# Patient Record
Sex: FEMALE | NOT HISPANIC OR LATINO | URBAN - METROPOLITAN AREA
[De-identification: names, ages, dates, MRNs, and addresses within clinical notes are randomized per-mention and may not be internally consistent; named-entity substitution may affect disease eponyms.]

---

## 2025-03-17 ENCOUNTER — OFFICE VISIT (OUTPATIENT)
Dept: URGENT CARE | Age: 30
End: 2025-03-17
Payer: COMMERCIAL

## 2025-03-17 ENCOUNTER — PHARMACY VISIT (OUTPATIENT)
Dept: PHARMACY | Facility: CLINIC | Age: 30
End: 2025-03-17
Payer: MEDICARE

## 2025-03-17 DIAGNOSIS — A05.9 FOOD POISONING: Primary | ICD-10-CM

## 2025-03-17 DIAGNOSIS — R19.7 DIARRHEA OF PRESUMED INFECTIOUS ORIGIN: ICD-10-CM

## 2025-03-17 DIAGNOSIS — R11.2 NAUSEA AND VOMITING, UNSPECIFIED VOMITING TYPE: ICD-10-CM

## 2025-03-17 PROCEDURE — RXMED WILLOW AMBULATORY MEDICATION CHARGE

## 2025-03-17 RX ORDER — AZITHROMYCIN 500 MG/1
500 TABLET, FILM COATED ORAL DAILY
Qty: 3 TABLET | Refills: 0 | Status: SHIPPED | OUTPATIENT
Start: 2025-03-17 | End: 2025-03-20

## 2025-03-17 RX ORDER — ONDANSETRON 4 MG/1
4 TABLET, ORALLY DISINTEGRATING ORAL EVERY 8 HOURS PRN
Qty: 15 TABLET | Refills: 0 | Status: SHIPPED | OUTPATIENT
Start: 2025-03-17 | End: 2025-03-22

## 2025-03-17 ASSESSMENT — ENCOUNTER SYMPTOMS
POLYDIPSIA: 0
SORE THROAT: 0
SHORTNESS OF BREATH: 0
NAUSEA: 1
FEVER: 1
COUGH: 0
ABDOMINAL PAIN: 1
DIARRHEA: 1
HEADACHES: 0
WHEEZING: 0
FATIGUE: 0
ARTHRALGIAS: 0
CONSTIPATION: 0
VOMITING: 1
MYALGIAS: 0
CHILLS: 1
BLOOD IN STOOL: 0
POLYPHAGIA: 0

## 2025-03-17 NOTE — PROGRESS NOTES
Subjective   Patient ID: Allen Kumar is a 29 y.o. female. They present today with a chief complaint of No chief complaint on file..    History of Present Illness  29 year old female presents with complaint of nausea, vomiting, diarrhea, and fever 101*F. Recently returned from a trip to [facility] with friends and reports eating a meal containing undercooked chicken. States her sx started approximately 36 hours after eating the chicken. Her travel friends have had similar sx. Took imodium with mild relief of loose, watery stools. 1 episode of emesis today. 12 loose stools per day.       Vomiting  Severity:  Mild  Duration:  3 days  Timing:  Intermittent  Number of daily episodes:  1  Quality:  Stomach contents  Able to tolerate:  Liquids  How soon after eating does vomiting occur:  10 minutes  Progression:  Unchanged  Chronicity:  New  Recent urination:  Normal  Context: not post-tussive and not self-induced    Relieved by:  Nothing  Worsened by:  Nothing  Ineffective treatments:  Liquids  Associated symptoms: abdominal pain (crampy, intermittant, generalized), chills, diarrhea and fever    Associated symptoms: no arthralgias, no cough, no headaches, no myalgias, no sore throat and no URI    Diarrhea:     Quality:  Watery    Severity:  Moderate    Duration:  3 days    Timing:  Intermittent    Progression:  Unchanged      Past Medical History  Allergies as of 03/17/2025    (No Known Allergies)       (Not in a hospital admission)       History reviewed. No pertinent past medical history.    History reviewed. No pertinent surgical history.     reports that she has never smoked. She has never used smokeless tobacco.    Review of Systems  Review of Systems   Constitutional:  Positive for chills and fever. Negative for fatigue.   HENT:  Negative for congestion and sore throat.    Respiratory:  Negative for cough, shortness of breath and wheezing.    Cardiovascular:  Negative for chest pain.   Gastrointestinal:  Positive for  abdominal pain (crampy, intermittant, generalized), diarrhea, nausea and vomiting. Negative for blood in stool and constipation.   Endocrine: Negative for polydipsia, polyphagia and polyuria.   Musculoskeletal:  Negative for arthralgias and myalgias.   Skin:  Negative for rash.   Neurological:  Negative for headaches.   All other systems reviewed and are negative.           Objective    There were no vitals filed for this visit.  No LMP recorded.    Physical Exam  Constitutional:       General: She is not in acute distress.     Appearance: Normal appearance. She is not ill-appearing or toxic-appearing.   HENT:      Head: Normocephalic.   Eyes:      Conjunctiva/sclera: Conjunctivae normal.   Pulmonary:      Effort: Pulmonary effort is normal. No respiratory distress.   Musculoskeletal:      Cervical back: Normal range of motion.   Neurological:      Mental Status: She is alert and oriented to person, place, and time.   Psychiatric:         Mood and Affect: Mood normal.         Behavior: Behavior normal.       Physical exam limited due to virtual environment.     Procedures    Point of Care Test & Imaging Results from this visit  No results found for this visit on 03/17/25.   No results found.    Diagnostic study results (if any) were reviewed by [facility].    Assessment/Plan   Allergies, medications, history, and pertinent labs/EKGs/Imaging reviewed by INESSA Plascencia-CNP.     Medical Decision Making  signs and symptoms consistent with food poisoning. No evidence of acute abdomen pain or evidence of dehydration. Treatment is antibiotic coverage, zofran ODT, supportive measures and hydration. Patient is advised to go to clinic or present to ED if symptoms change, worsen, or do not begin to improve within 24-48 hours.  Otherwise follow with PCP.  Patient verbalized understanding and agreeable with plan of care.   I have communicated my name and active licensure information to the patient. The patient's  identity and physical location were verified at the time of this visit. Either the patient or their legal representative were informed of the risks and benefits of, and alternatives to, treatment through a remote evaluation. The patient consented to proceed with the evaluation remotely. This remote visit was conducted using video and audio.         Orders and Diagnoses  Diagnoses and all orders for this visit:  Food poisoning  -     azithromycin (Zithromax) 500 mg tablet; Take 1 tablet (500 mg) by mouth once daily for 3 days.  -     ondansetron ODT (Zofran-ODT) 4 mg disintegrating tablet; Dissolve 1 tablet (4 mg) in the mouth every 8 hours if needed for nausea or vomiting for up to 5 days.  Nausea and vomiting, unspecified vomiting type  Diarrhea of presumed infectious origin      Medical Admin Record      Patient disposition: Home    Electronically signed by [facility]  3:15 PM    Urgent Care Virtual Video Visit    Patient Location: [place]   Provider Location: [facility]    Video visit completed with realtime synchronous video/audio connection. Informed consent was obtained from the patient. Patient was made aware that my evaluation and diagnosis are limited due to the fact that we are not in the same room during the interview and that this is a virtual encounter that took place via videoconferencing. Patient verbalized understanding.     Patient disposition: Home    Electronically signed by [facility]  3:15 PM

## 2025-03-17 NOTE — PATIENT INSTRUCTIONS
1. Hydration & Diet  Drink plenty of fluids (water, electrolyte solutions like Pedialyte or Gatorade) to prevent dehydration.  Eat small, bland meals (bananas, rice, applesauce, toast, crackers, broth).  Avoid dairy, greasy, spicy, or high-fiber foods until fully recovered.  No alcohol or caffeine, as they can worsen dehydration.  2. Symptom Management  Rest as much as possible to help your body fight the infection.  Use acetaminophen (Tylenol) if you have a fever or body aches.  Avoid anti-diarrheal medications (like Imodium) unless instructed by a doctor, as they may slow the body’s ability to clear the infection.  3. Monitor for Dehydration  Watch for signs such as:    Dark urine or not urinating often.  Dry mouth, dizziness, weakness.  Rapid heartbeat or confusion.  If dehydration symptoms worsen, seek medical care.  4. When to Seek Medical Attention  Bloody stools or worsening diarrhea lasting more than 7 days.  High fever (above 102°F/39°C).  Severe abdominal pain or persistent vomiting.  Signs of severe dehydration (dizziness, fainting, confusion).  5. Preventing Spread  Wash hands thoroughly after using the bathroom and before eating.  Avoid preparing food for others until symptom-free for at least 48 hours.  Clean and disinfect surfaces to

## 2025-05-27 PROCEDURE — RXMED WILLOW AMBULATORY MEDICATION CHARGE

## 2025-05-28 ENCOUNTER — PHARMACY VISIT (OUTPATIENT)
Dept: PHARMACY | Facility: CLINIC | Age: 30
End: 2025-05-28
Payer: MEDICARE

## 2025-08-08 PROCEDURE — RXMED WILLOW AMBULATORY MEDICATION CHARGE

## 2025-08-12 ENCOUNTER — PHARMACY VISIT (OUTPATIENT)
Dept: PHARMACY | Facility: CLINIC | Age: 30
End: 2025-08-12
Payer: MEDICARE